# Patient Record
Sex: FEMALE | Race: WHITE | Employment: UNEMPLOYED | RURAL
[De-identification: names, ages, dates, MRNs, and addresses within clinical notes are randomized per-mention and may not be internally consistent; named-entity substitution may affect disease eponyms.]

---

## 2024-03-18 SDOH — HEALTH STABILITY: PHYSICAL HEALTH: ON AVERAGE, HOW MANY DAYS PER WEEK DO YOU ENGAGE IN MODERATE TO STRENUOUS EXERCISE (LIKE A BRISK WALK)?: 0 DAYS

## 2024-03-21 ENCOUNTER — OFFICE VISIT (OUTPATIENT)
Dept: PRIMARY CARE CLINIC | Age: 64
End: 2024-03-21
Payer: MEDICARE

## 2024-03-21 VITALS
OXYGEN SATURATION: 96 % | HEART RATE: 63 BPM | WEIGHT: 149.6 LBS | SYSTOLIC BLOOD PRESSURE: 91 MMHG | DIASTOLIC BLOOD PRESSURE: 52 MMHG

## 2024-03-21 DIAGNOSIS — K44.9 HIATAL HERNIA: ICD-10-CM

## 2024-03-21 DIAGNOSIS — R00.2 PALPITATION: ICD-10-CM

## 2024-03-21 DIAGNOSIS — Z87.11 HISTORY OF GASTRIC ULCER: ICD-10-CM

## 2024-03-21 DIAGNOSIS — K75.81 LIVER CIRRHOSIS SECONDARY TO NONALCOHOLIC STEATOHEPATITIS (NASH) (HCC): ICD-10-CM

## 2024-03-21 DIAGNOSIS — R79.9 ABNORMAL FINDING OF BLOOD CHEMISTRY, UNSPECIFIED: ICD-10-CM

## 2024-03-21 DIAGNOSIS — Z76.89 ENCOUNTER TO ESTABLISH CARE WITH NEW DOCTOR: Primary | ICD-10-CM

## 2024-03-21 DIAGNOSIS — Z86.39 HISTORY OF PARATHYROID DISEASE: ICD-10-CM

## 2024-03-21 DIAGNOSIS — E83.52 HYPERCALCEMIA: ICD-10-CM

## 2024-03-21 DIAGNOSIS — K74.60 LIVER CIRRHOSIS SECONDARY TO NONALCOHOLIC STEATOHEPATITIS (NASH) (HCC): ICD-10-CM

## 2024-03-21 DIAGNOSIS — J30.2 SEASONAL ALLERGIES: ICD-10-CM

## 2024-03-21 DIAGNOSIS — Z13.220 ENCOUNTER FOR LIPID SCREENING FOR CARDIOVASCULAR DISEASE: ICD-10-CM

## 2024-03-21 DIAGNOSIS — N28.9 KIDNEY LESION: ICD-10-CM

## 2024-03-21 DIAGNOSIS — Z13.6 ENCOUNTER FOR LIPID SCREENING FOR CARDIOVASCULAR DISEASE: ICD-10-CM

## 2024-03-21 DIAGNOSIS — K59.09 OTHER CONSTIPATION: ICD-10-CM

## 2024-03-21 DIAGNOSIS — D50.9 IRON DEFICIENCY ANEMIA, UNSPECIFIED IRON DEFICIENCY ANEMIA TYPE: ICD-10-CM

## 2024-03-21 PROCEDURE — G8400 PT W/DXA NO RESULTS DOC: HCPCS | Performed by: PHYSICIAN ASSISTANT

## 2024-03-21 PROCEDURE — G8421 BMI NOT CALCULATED: HCPCS | Performed by: PHYSICIAN ASSISTANT

## 2024-03-21 PROCEDURE — 99203 OFFICE O/P NEW LOW 30 MIN: CPT | Performed by: PHYSICIAN ASSISTANT

## 2024-03-21 PROCEDURE — 1123F ACP DISCUSS/DSCN MKR DOCD: CPT | Performed by: PHYSICIAN ASSISTANT

## 2024-03-21 PROCEDURE — G8427 DOCREV CUR MEDS BY ELIG CLIN: HCPCS | Performed by: PHYSICIAN ASSISTANT

## 2024-03-21 PROCEDURE — 4004F PT TOBACCO SCREEN RCVD TLK: CPT | Performed by: PHYSICIAN ASSISTANT

## 2024-03-21 PROCEDURE — 1090F PRES/ABSN URINE INCON ASSESS: CPT | Performed by: PHYSICIAN ASSISTANT

## 2024-03-21 PROCEDURE — 3017F COLORECTAL CA SCREEN DOC REV: CPT | Performed by: PHYSICIAN ASSISTANT

## 2024-03-21 PROCEDURE — G8484 FLU IMMUNIZE NO ADMIN: HCPCS | Performed by: PHYSICIAN ASSISTANT

## 2024-03-21 RX ORDER — SUCRALFATE 1 G/1
1 TABLET ORAL 4 TIMES DAILY
COMMUNITY

## 2024-03-21 RX ORDER — IRON HEME POLYPEPTIDE/FOLIC AC 12-1MG
1 TABLET ORAL 2 TIMES DAILY
COMMUNITY

## 2024-03-21 RX ORDER — PROPRANOLOL HYDROCHLORIDE 20 MG/1
20 TABLET ORAL 2 TIMES DAILY
COMMUNITY

## 2024-03-21 RX ORDER — SPIRONOLACTONE 100 MG/1
100 TABLET, FILM COATED ORAL DAILY
COMMUNITY

## 2024-03-21 RX ORDER — DOCUSATE SODIUM 100 MG/1
100 CAPSULE, LIQUID FILLED ORAL 3 TIMES DAILY PRN
COMMUNITY

## 2024-03-21 RX ORDER — ESOMEPRAZOLE MAGNESIUM 20 MG/1
20 GRANULE, DELAYED RELEASE ORAL 2 TIMES DAILY
COMMUNITY

## 2024-03-21 RX ORDER — LORATADINE 10 MG/1
10 TABLET ORAL DAILY
COMMUNITY

## 2024-03-21 ASSESSMENT — PATIENT HEALTH QUESTIONNAIRE - PHQ9
SUM OF ALL RESPONSES TO PHQ9 QUESTIONS 1 & 2: 0
SUM OF ALL RESPONSES TO PHQ QUESTIONS 1-9: 0
SUM OF ALL RESPONSES TO PHQ QUESTIONS 1-9: 0
1. LITTLE INTEREST OR PLEASURE IN DOING THINGS: NOT AT ALL
2. FEELING DOWN, DEPRESSED OR HOPELESS: NOT AT ALL
SUM OF ALL RESPONSES TO PHQ QUESTIONS 1-9: 0
SUM OF ALL RESPONSES TO PHQ QUESTIONS 1-9: 0

## 2024-03-21 ASSESSMENT — ENCOUNTER SYMPTOMS
GASTROINTESTINAL NEGATIVE: 1
RESPIRATORY NEGATIVE: 1

## 2024-03-21 NOTE — PROGRESS NOTES
Chief Complaint   Patient presents with    Establish Care     Pt here to establish care with provider.        Have you seen any other physician or provider since your last visit yes -     Have you had any other diagnostic tests since your last visit? no    Have you changed or stopped any medications since your last visit? no           
Patient Fasting?/# of Hours     Answer:   12     Order Specific Question:   Has the patient fasted?     Answer:   Yes    TSH with Reflex     Standing Status:   Future     Standing Expiration Date:   4/21/2024    Vitamin D 25 Hydroxy     Standing Status:   Future     Standing Expiration Date:   4/21/2024    Iron and TIBC     Standing Status:   Future     Standing Expiration Date:   4/21/2024     Order Specific Question:   Is Patient Fasting?     Answer:   yes     Order Specific Question:   No of Hours?     Answer:   12    Ferritin     Standing Status:   Future     Standing Expiration Date:   4/21/2024    Transferrin Saturation     Standing Status:   Future     Standing Expiration Date:   4/21/2024    Hepatic Function Panel     Standing Status:   Future     Standing Expiration Date:   4/21/2024    External Referral To Gastroenterology     Referral Priority:   Routine     Referral Type:   Eval and Treat     Referral Reason:   Specialty Services Required     Requested Specialty:   Gastroenterology     Number of Visits Requested:   1           Electronically signed by LYNSEY Stark on 3/21/2024

## 2024-03-21 NOTE — ASSESSMENT & PLAN NOTE
History of gastric ulcer in the past.  Will no longer take NSAIDs.  P.o. and IM steroids have both caused abdominal pain.  Will avoid these in the future as well.  No abdominal pain, NVD, melena, bloody stool.  Continue to monitor.

## 2024-03-21 NOTE — ASSESSMENT & PLAN NOTE
Stable.  Had mass on parathyroid gland.  Was removed surgically.  Will continue to monitor calcium and vitamin D levels.

## 2024-03-21 NOTE — ASSESSMENT & PLAN NOTE
History of iron deficient anemia.  She is currently on iron supplements.  Will continue to monitor iron levels and CBC.  Has been complicated in the past to clinic concurrent GI bleed.  No NVD, melena, bloody stools at this time.  Continue to monitor.

## 2024-03-21 NOTE — ASSESSMENT & PLAN NOTE
Follows with GI.  Will continue to monitor hepatic function panel for worsening liver function.  Has been on transplant list before.

## 2024-03-21 NOTE — ASSESSMENT & PLAN NOTE
History of palpitations.  I think cardiology in the past.  Has had Holter monitor.  Overall negative workup.  Was given propranolol to control symptoms.

## 2024-03-26 ENCOUNTER — TRANSCRIBE ORDERS (OUTPATIENT)
Dept: ADMINISTRATIVE | Facility: HOSPITAL | Age: 64
End: 2024-03-26
Payer: MEDICARE

## 2024-03-26 ENCOUNTER — HOSPITAL ENCOUNTER (OUTPATIENT)
Facility: HOSPITAL | Age: 64
Discharge: HOME | End: 2024-03-26
Payer: MEDICARE

## 2024-03-26 DIAGNOSIS — N28.9 KIDNEY LESION: Primary | ICD-10-CM

## 2024-03-26 LAB
25(OH)D3 SERPL-MCNC: 54.4 NG/ML (ref 32–100)
ALBUMIN SERPL-MCNC: 4.6 G/DL (ref 3.4–4.8)
ALBUMIN/GLOB SERPL: 1.8 {RATIO} (ref 0.8–2)
ALP SERPL-CCNC: 64 U/L (ref 25–100)
ALT SERPL-CCNC: 19 U/L (ref 4–36)
ANION GAP SERPL CALCULATED.3IONS-SCNC: 10 MMOL/L (ref 3–16)
AST SERPL-CCNC: 22 U/L (ref 8–33)
BASOPHILS # BLD: 0 K/UL (ref 0–0.1)
BASOPHILS NFR BLD: 0.3 %
BILIRUB DIRECT SERPL-MCNC: <0.2 MG/DL (ref 0–0.2)
BILIRUB INDIRECT SERPL-MCNC: NORMAL MG/DL (ref 0.2–0.8)
BILIRUB SERPL-MCNC: 1.3 MG/DL (ref 0.3–1.2)
BUN SERPL-MCNC: 15 MG/DL (ref 6–20)
CALCIUM SERPL-MCNC: 9.8 MG/DL (ref 8.5–10.5)
CHLORIDE SERPL-SCNC: 99 MMOL/L (ref 98–107)
CHOLEST SERPL-MCNC: 216 MG/DL (ref 0–200)
CO2 SERPL-SCNC: 31 MMOL/L (ref 20–30)
CREAT SERPL-MCNC: 0.7 MG/DL (ref 0.4–1.2)
EOSINOPHIL # BLD: 0.1 K/UL (ref 0–0.4)
EOSINOPHIL NFR BLD: 3.2 %
ERYTHROCYTE [DISTWIDTH] IN BLOOD BY AUTOMATED COUNT: 12.4 % (ref 11–16)
FERRITIN SERPL IA-MCNC: 94 NG/ML (ref 22–322)
GFR SERPLBLD CREATININE-BSD FMLA CKD-EPI: >90 ML/MIN/{1.73_M2}
GLOBULIN SER CALC-MCNC: 2.5 G/DL
GLUCOSE SERPL-MCNC: 78 MG/DL (ref 74–106)
HBA1C MFR BLD: 5.1 %
HCT VFR BLD AUTO: 40.6 % (ref 37–47)
HDLC SERPL-MCNC: 84 MG/DL (ref 40–60)
HGB BLD-MCNC: 13.4 G/DL (ref 11.5–16.5)
IMM GRANULOCYTES # BLD: 0 K/UL
IMM GRANULOCYTES NFR BLD: 0.3 % (ref 0–5)
IRON SERPL-MCNC: 153 UG/DL (ref 37–145)
LDLC SERPL CALC-MCNC: 119 MG/DL
LYMPHOCYTES # BLD: 1.1 K/UL (ref 1.5–4)
LYMPHOCYTES NFR BLD: 32 %
MCH RBC QN AUTO: 31.2 PG (ref 27–32)
MCHC RBC AUTO-ENTMCNC: 33 G/DL (ref 31–35)
MCV RBC AUTO: 94.4 FL (ref 80–100)
MONOCYTES # BLD: 0.3 K/UL (ref 0.2–0.8)
MONOCYTES NFR BLD: 8.6 %
NEUTROPHILS # BLD: 1.9 K/UL (ref 2–7.5)
NEUTS SEG NFR BLD: 55.6 %
PLATELET # BLD AUTO: 153 K/UL (ref 150–400)
PMV BLD AUTO: 10.6 FL (ref 6–10)
POTASSIUM SERPL-SCNC: 4.4 MMOL/L (ref 3.4–5.1)
PROT SERPL-MCNC: 7.1 G/DL (ref 6.4–8.3)
RBC # BLD AUTO: 4.3 M/UL (ref 3.8–5.8)
SODIUM SERPL-SCNC: 140 MMOL/L (ref 136–145)
TIBC SERPL-MCNC: 353 UG/DL (ref 250–450)
TRANSFERRIN SERPL-MCNC: 330 MG/DL (ref 215–380)
TRIGL SERPL-MCNC: 67 MG/DL (ref 0–249)
TSH SERPL-MCNC: 2.4 UIU/ML (ref 0.27–4.2)
VLDLC SERPL CALC-MCNC: 13 MG/DL
WBC # BLD AUTO: 3.5 K/UL (ref 4–11)

## 2024-03-26 PROCEDURE — 85025 COMPLETE CBC W/AUTO DIFF WBC: CPT

## 2024-03-26 PROCEDURE — 82248 BILIRUBIN DIRECT: CPT

## 2024-03-26 PROCEDURE — 84443 ASSAY THYROID STIM HORMONE: CPT

## 2024-03-26 PROCEDURE — 83036 HEMOGLOBIN GLYCOSYLATED A1C: CPT

## 2024-03-26 PROCEDURE — 82728 ASSAY OF FERRITIN: CPT

## 2024-03-26 PROCEDURE — 80061 LIPID PANEL: CPT

## 2024-03-26 PROCEDURE — 83540 ASSAY OF IRON: CPT

## 2024-03-26 PROCEDURE — 80053 COMPREHEN METABOLIC PANEL: CPT

## 2024-03-26 PROCEDURE — 82306 VITAMIN D 25 HYDROXY: CPT

## 2024-03-26 PROCEDURE — 84466 ASSAY OF TRANSFERRIN: CPT

## 2024-03-26 PROCEDURE — 36415 COLL VENOUS BLD VENIPUNCTURE: CPT

## 2024-05-02 ENCOUNTER — TELEMEDICINE (OUTPATIENT)
Dept: PRIMARY CARE CLINIC | Age: 64
End: 2024-05-02

## 2024-05-02 DIAGNOSIS — J32.9 SINUSITIS, UNSPECIFIED CHRONICITY, UNSPECIFIED LOCATION: Primary | ICD-10-CM

## 2024-05-02 RX ORDER — DEXTROMETHORPHAN HYDROBROMIDE AND PROMETHAZINE HYDROCHLORIDE 15; 6.25 MG/5ML; MG/5ML
5 SYRUP ORAL 4 TIMES DAILY PRN
Qty: 180 ML | Refills: 0 | Status: SHIPPED | OUTPATIENT
Start: 2024-05-02 | End: 2024-05-09

## 2024-05-02 RX ORDER — AMOXICILLIN 875 MG/1
875 TABLET, COATED ORAL 2 TIMES DAILY
Qty: 14 TABLET | Refills: 0 | Status: SHIPPED | OUTPATIENT
Start: 2024-05-02 | End: 2024-05-09

## 2024-05-02 ASSESSMENT — ENCOUNTER SYMPTOMS
GASTROINTESTINAL NEGATIVE: 1
COUGH: 1
RHINORRHEA: 1
SORE THROAT: 1

## 2024-05-02 NOTE — PROGRESS NOTES
Karolina Noguera is a 63 y.o. female evaluated via audio-only technology on 5/2/2024 for No chief complaint on file.  .      Explained to patient this is a virtual visit.  This does not replace or equate to an in person evaluation.  Explained that I am unable to perform a full physical exam or do any testing in this situation. If patient feels the need to be seen in person they understand to go to the nearest emergency room.    Assessment & Plan:   Sinusitis, unspecified chronicity, unspecified location    Will send at home COVID test to patient to check for COVID.  Likely viral URI.  Since it has been a week and she has had a mild fever, will call on amoxicillin and Promethazine DM.  Explained that if it is viral antibiotics will not treated.  Any worsening go to ER.  Return to clinic as needed.    No follow-ups on file.     Subjective:     Pt call with c/o 1 week of cough, sneeze, stuffy/runny nose.  Has tried Robitussin and Benadryl OTC with minimal relief.  Denies any chest pain or shortness of breath.  Has had a temperature up to 100.    LMP - hyst     Prior to Admission medications    Medication Sig Start Date End Date Taking? Authorizing Provider   amoxicillin (AMOXIL) 875 MG tablet Take 1 tablet by mouth 2 times daily for 7 days 5/2/24 5/9/24 Yes Evan Swanson PA   promethazine-dextromethorphan (PROMETHAZINE-DM) 6.25-15 MG/5ML syrup Take 5 mLs by mouth 4 times daily as needed for Cough 5/2/24 5/9/24 Yes Evan Swanson PA   docusate sodium (COLACE) 100 MG capsule Take 1 capsule by mouth 3 times daily as needed for Constipation    Anne Marie Benz MD   esomeprazole Magnesium (NEXIUM) 20 MG PACK Take 1 packet by mouth 2 times daily    Anne Marie Benz MD   loratadine (CLARITIN) 10 MG tablet Take 1 tablet by mouth daily    Anne Marie Benz MD   Fe Heme Polypeptide-Folic Acid (PROFERRIN-FORTE) 12-1 MG TABS Take 1 tablet by mouth 2 times daily    Anne Marie Benz MD   propranolol (INDERAL) 20

## 2024-05-10 ENCOUNTER — COMMUNITY OUTREACH (OUTPATIENT)
Dept: PRIMARY CARE CLINIC | Age: 64
End: 2024-05-10

## 2024-05-10 NOTE — PROGRESS NOTES
Patient's  shows they are overdue for Mammogram, Colonoscopy   CareEverywhere and  files searched  without success.

## 2024-05-14 ENCOUNTER — HOSPITAL ENCOUNTER (OUTPATIENT)
Dept: MRI IMAGING | Facility: HOSPITAL | Age: 64
Discharge: HOME | End: 2024-05-14
Admitting: PHYSICIAN ASSISTANT
Payer: MEDICARE

## 2024-05-14 DIAGNOSIS — N28.9 KIDNEY LESION: ICD-10-CM

## 2024-05-14 PROCEDURE — 74181 MRI ABDOMEN W/O CONTRAST: CPT

## 2024-05-27 SDOH — ECONOMIC STABILITY: FOOD INSECURITY: WITHIN THE PAST 12 MONTHS, YOU WORRIED THAT YOUR FOOD WOULD RUN OUT BEFORE YOU GOT MONEY TO BUY MORE.: NEVER TRUE

## 2024-05-27 SDOH — ECONOMIC STABILITY: HOUSING INSECURITY
IN THE LAST 12 MONTHS, WAS THERE A TIME WHEN YOU DID NOT HAVE A STEADY PLACE TO SLEEP OR SLEPT IN A SHELTER (INCLUDING NOW)?: NO

## 2024-05-27 SDOH — ECONOMIC STABILITY: INCOME INSECURITY: HOW HARD IS IT FOR YOU TO PAY FOR THE VERY BASICS LIKE FOOD, HOUSING, MEDICAL CARE, AND HEATING?: NOT VERY HARD

## 2024-05-27 SDOH — ECONOMIC STABILITY: TRANSPORTATION INSECURITY
IN THE PAST 12 MONTHS, HAS LACK OF TRANSPORTATION KEPT YOU FROM MEETINGS, WORK, OR FROM GETTING THINGS NEEDED FOR DAILY LIVING?: NO

## 2024-05-27 SDOH — ECONOMIC STABILITY: FOOD INSECURITY: WITHIN THE PAST 12 MONTHS, THE FOOD YOU BOUGHT JUST DIDN'T LAST AND YOU DIDN'T HAVE MONEY TO GET MORE.: NEVER TRUE

## 2024-05-28 ENCOUNTER — OFFICE VISIT (OUTPATIENT)
Dept: PRIMARY CARE CLINIC | Age: 64
End: 2024-05-28

## 2024-05-28 VITALS
HEART RATE: 73 BPM | SYSTOLIC BLOOD PRESSURE: 96 MMHG | OXYGEN SATURATION: 95 % | DIASTOLIC BLOOD PRESSURE: 51 MMHG | WEIGHT: 150.6 LBS

## 2024-05-28 DIAGNOSIS — K74.60 LIVER CIRRHOSIS SECONDARY TO NONALCOHOLIC STEATOHEPATITIS (NASH) (HCC): ICD-10-CM

## 2024-05-28 DIAGNOSIS — I95.9 HYPOTENSION, UNSPECIFIED HYPOTENSION TYPE: ICD-10-CM

## 2024-05-28 DIAGNOSIS — J30.2 SEASONAL ALLERGIES: ICD-10-CM

## 2024-05-28 DIAGNOSIS — R00.2 PALPITATION: Primary | ICD-10-CM

## 2024-05-28 DIAGNOSIS — K75.81 LIVER CIRRHOSIS SECONDARY TO NONALCOHOLIC STEATOHEPATITIS (NASH) (HCC): ICD-10-CM

## 2024-05-28 DIAGNOSIS — Z87.11 HISTORY OF GASTRIC ULCER: ICD-10-CM

## 2024-05-28 DIAGNOSIS — M79.89 LEG SWELLING: ICD-10-CM

## 2024-05-28 DIAGNOSIS — N28.9 KIDNEY LESION: ICD-10-CM

## 2024-05-28 DIAGNOSIS — D50.9 IRON DEFICIENCY ANEMIA, UNSPECIFIED IRON DEFICIENCY ANEMIA TYPE: ICD-10-CM

## 2024-05-28 ASSESSMENT — ENCOUNTER SYMPTOMS
GASTROINTESTINAL NEGATIVE: 1
RESPIRATORY NEGATIVE: 1

## 2024-05-28 NOTE — PROGRESS NOTES
Chief Complaint   Patient presents with    Follow-up     Pt here for FU, has done MRI report is in chart.        Have you seen any other physician or provider since your last visit no    Have you had any other diagnostic tests since your last visit? no    Have you changed or stopped any medications since your last visit? no         
HDL Cholesterol: 84 mg/dL      Total Cholesterol: 216 mg/dL  Diabetes Screening:  DM foot exam- n/a  DM Retinal exam - n/a  Last CRC screen - Mom had CRC at 41. + Polyps. Gets q5 years. Last one was .   Intimate partner violence - denies   DEXA - Dec 2023. Has osteopenia.   LMP - hyst   Last mammo -  Dec 2023. Wnl. Yearly.    Last PAP - complete hyst   Vaccination status: Otherwise UTD  Needed:   Declines:   Tobacco use- never  Quit - n/a  EtOH use - never  Quit - n/a  Dentist - yearly  Derm - does not see  Sunscreen - occasionally   Hep C and HIV - low risk      Current Outpatient Medications:     docusate sodium (COLACE) 100 MG capsule, Take 1 capsule by mouth 3 times daily as needed for Constipation, Disp: , Rfl:     esomeprazole Magnesium (NEXIUM) 20 MG PACK, Take 1 packet by mouth 2 times daily, Disp: , Rfl:     loratadine (CLARITIN) 10 MG tablet, Take 1 tablet by mouth daily, Disp: , Rfl:     Fe Heme Polypeptide-Folic Acid (PROFERRIN-FORTE) 12-1 MG TABS, Take 1 tablet by mouth 2 times daily, Disp: , Rfl:     propranolol (INDERAL) 20 MG tablet, Take 1 tablet by mouth 2 times daily, Disp: , Rfl:     spironolactone (ALDACTONE) 100 MG tablet, Take 1 tablet by mouth daily, Disp: , Rfl:     sucralfate (CARAFATE) 1 GM tablet, Take 1 tablet by mouth 4 times daily, Disp: , Rfl:   Past Medical History:   Diagnosis Date    Nonalcoholic fatty liver disease     Trigger finger          Past Surgical History:   Procedure Laterality Date    BILE DUCT STENT PLACEMENT           SECTION          CHOLECYSTECTOMY      2007    DILATION AND CURETTAGE OF UTERUS      1982    ESOPHAGOGASTRODUODENOSCOPY      GASTRIC BYPASS SURGERY      2006    PARATHYROIDECTOMY      PARTIAL HYSTERECTOMY (CERVIX NOT REMOVED)      SALPINGO-OOPHORECTOMY        reports that she has never smoked. She has never been exposed to tobacco smoke. She has never used smokeless tobacco. She reports that she does not drink alcohol and does

## 2024-05-29 ENCOUNTER — TRANSCRIBE ORDERS (OUTPATIENT)
Dept: ADMINISTRATIVE | Facility: HOSPITAL | Age: 64
End: 2024-05-29
Payer: MEDICARE

## 2024-05-29 DIAGNOSIS — N28.9 KIDNEY LESION: Primary | ICD-10-CM

## 2024-06-04 ENCOUNTER — TRANSCRIBE ORDERS (OUTPATIENT)
Dept: ADMINISTRATIVE | Facility: HOSPITAL | Age: 64
End: 2024-06-04
Payer: MEDICARE

## 2024-06-04 DIAGNOSIS — N28.9 KIDNEY LESION: Primary | ICD-10-CM

## 2024-06-26 ENCOUNTER — OFFICE VISIT (OUTPATIENT)
Dept: GASTROENTEROLOGY | Facility: CLINIC | Age: 64
End: 2024-06-26
Payer: MEDICARE

## 2024-06-26 VITALS
HEIGHT: 65 IN | WEIGHT: 151 LBS | TEMPERATURE: 97.7 F | SYSTOLIC BLOOD PRESSURE: 110 MMHG | HEART RATE: 92 BPM | DIASTOLIC BLOOD PRESSURE: 56 MMHG | BODY MASS INDEX: 25.16 KG/M2

## 2024-06-26 DIAGNOSIS — Z86.718 PERSONAL HISTORY OF OTHER VENOUS THROMBOSIS AND EMBOLISM: ICD-10-CM

## 2024-06-26 DIAGNOSIS — K75.81 NASH (NONALCOHOLIC STEATOHEPATITIS): Primary | ICD-10-CM

## 2024-06-26 DIAGNOSIS — Z86.010 HISTORY OF COLON POLYPS: ICD-10-CM

## 2024-06-26 DIAGNOSIS — K76.6 PORTAL HYPERTENSION: ICD-10-CM

## 2024-06-26 DIAGNOSIS — K75.81 METABOLIC DYSFUNCTION-ASSOCIATED STEATOHEPATITIS (MASH): ICD-10-CM

## 2024-06-26 DIAGNOSIS — Z80.0 FAMILY HISTORY OF COLON CANCER: ICD-10-CM

## 2024-06-26 RX ORDER — IRON HEME POLYPEPTIDE/FOLIC AC 12-1MG
1 TABLET ORAL 2 TIMES DAILY
COMMUNITY

## 2024-06-26 RX ORDER — SUCRALFATE 1 G/1
1 TABLET ORAL 3 TIMES DAILY PRN
COMMUNITY

## 2024-06-26 RX ORDER — DOCUSATE SODIUM 100 MG/1
100 CAPSULE, LIQUID FILLED ORAL 3 TIMES DAILY PRN
COMMUNITY

## 2024-06-26 RX ORDER — ESOMEPRAZOLE MAGNESIUM 20 MG/1
20 GRANULE, DELAYED RELEASE ORAL 2 TIMES DAILY
COMMUNITY

## 2024-06-26 NOTE — PROGRESS NOTES
New Patient Consult      Date: 2024   Patient Name: Zunilda Garcias  MRN: [id number]  : 1960     Referring Physician: Magan Mills PA    Chief Complaint   Patient presents with    Establish Care     LIver cirrhosis, KAITLYN, Hx gastric ulcer         History of Present Illness: Zunilda Garcias is a 63 y.o. female who is here today to establish care with Gastroenterology.    Not having any active symptoms at this time.  She does have a fair bit of GI history as follows:    She had a gastric bypass in .    A hiatal hernia was surgically repaired in .    She underwent a cholecystectomy after that, and unfortunately had a significant bile duct injury.  She mentions that the common bile duct got transected during her laparoscopic cholecystectomy.  Sounds like she underwent a surgical bile duct repair for this.  The postop course was complicated by a blood clot.  She seems to recall that could have been in her bile duct, but it is possible that this could have been a hepatic venous thrombosis.    She was on warfarin for some time, but has not been on it for many years.  This was being watched closely by her hepatobiliary team, with serial imaging, and then eventually they mention that it was resorbed, but it would potentially give her a higher risk of portal hypertension down the road.    She does have portal hypertension.  At 1 point she even had ascites, presumed secondary to this.    She is on a nonselective beta-blocker at the moment.  Has been on this for some time.    She is also on spironolactone, as she has had chronic lower extremity edema ever since the above history.    At the time being she denies any symptoms.  She also has a history of a gastric ulcer back in .    She has also had a history of iron deficiency anemia, and has had iron replacement therapy.  She denies any black stool or melena or blood per rectum or hematemesis at the moment.    Her last abdominal ultrasound was in  2024.      She also had an MRI done recently without IV contrast in May 2024 to evaluate some kidney lesions, that were seen on ultrasound.  Numerous bilateral renal lesions identified, many of which were described to be cysts, but there was a recommendation to follow-up with a contrast enhanced MRI, which is currently pending for next month.    Regarding the biliary system, they describe postoperative changes at the zana hepatis.  Surgical changes of the extrahepatic biliary tree, prominent hepatic vein branches, and portosystemic shunt versus vascular malformation in the inferior right liver.  Spleen was mildly enlarged.  No significant intrahepatic biliary ductal dilation.    Her last colonoscopy was in 2020.  Next due in 2025.    Subjective      Past Medical History:   Diagnosis Date    Cirrhosis of liver     Common bile duct clot     was severed during GB surgery    Cystic kidney disease     Esophageal varices     After common bile duct injury and blood clot in CBD    Gastric ulcer     GI (gastrointestinal bleed)     After surgical repair of CBD injury    Hypercalcemia     Iron deficiency anemia     Irregular heartbeat     Kidney lesion     Liver cirrhosis secondary to COLON     Palpitations     Seasonal allergies        Past Surgical History:   Procedure Laterality Date    ABDOMINAL HERNIA REPAIR       SECTION N/A      SECTION N/A     COLONOSCOPY      Routine every 5 years    COMMON BILE DUCT EXPLORATION N/A     repair the severing that happened during GB surgery    DILATATION AND CURETTAGE N/A     ENDOSCOPY N/A     GASTRIC BYPASS N/A     HYSTERECTOMY N/A     cervix not removed    LAPAROSCOPIC CHOLECYSTECTOMY N/A     Clot in bile duct, post surgery complication    PARATHYROIDECTOMY      SALPINGO OOPHORECTOMY Bilateral     TRIGGER FINGER RELEASE      UPPER GASTROINTESTINAL ENDOSCOPY      Follow up per previous  Gastroenterologist       Family History   Problem Relation Age of Onset    Colon cancer Mother         Surgically removed at 42 years old    Colon polyps Mother     Ovarian cancer Mother     Alcohol abuse Father     Inflammatory bowel disease Paternal Aunt     Colon cancer Cousin        Social History     Socioeconomic History    Marital status:    Tobacco Use    Smoking status: Never    Smokeless tobacco: Never    Tobacco comments:     Zero tobacco use ever   Vaping Use    Vaping status: Never Used   Substance and Sexual Activity    Alcohol use: Never    Drug use: Never    Sexual activity: Defer         Current Outpatient Medications:     Calcium Citrate (CITRACAL PO), Take  by mouth Daily., Disp: , Rfl:     docusate sodium (COLACE) 100 MG capsule, Take 1 capsule by mouth 3 (Three) Times a Day As Needed for Constipation., Disp: , Rfl:     esomeprazole (NexIUM) 20 MG packet, Take 20 mg by mouth 2 (Two) Times a Day., Disp: , Rfl:     Fe Heme Polypeptide-Folic Acid (Proferrin-Forte) 12-1 MG tablet, Take 1 tablet by mouth 2 (Two) Times a Day., Disp: , Rfl:     Loratadine (Claritin) 10 MG capsule, Take  by mouth., Disp: , Rfl:     multivitamin with minerals tablet tablet, Take 1 tablet by mouth Daily., Disp: , Rfl:     propranolol (INDERAL) 20 MG tablet, , Disp: , Rfl:     spironolactone (ALDACTONE) 100 MG tablet, , Disp: , Rfl:     sucralfate (CARAFATE) 1 g tablet, Take 1 tablet by mouth 3 (Three) Times a Day As Needed., Disp: , Rfl:     Allergies   Allergen Reactions    Nsaids Nausea Only     Hx Bleeding ulcer         Review of Systems   Constitutional:  Positive for appetite change (when her belly is upset). Negative for fatigue, fever and unexpected weight loss.   HENT:  Negative for trouble swallowing.    Gastrointestinal:  Positive for abdominal pain (sometimes, with certain foods) and constipation. Negative for abdominal distention, anal bleeding, blood in stool, diarrhea, nausea, rectal pain, vomiting,  "GERD and indigestion.       The following portions of the patient's history were reviewed and updated as appropriate: allergies, current medications, past family history, past medical history, past social history, past surgical history and problem list.    Objective     Physical Exam:  Vitals:    06/26/24 0956   BP: 110/56   Pulse: 92   Temp: 97.7 °F (36.5 °C)   TempSrc: Infrared   Weight: 68.5 kg (151 lb)  Comment: with clothing/shoes   Height: 165.1 cm (65\")  Comment: per pt       Physical Exam  Constitutional:       General: She is not in acute distress.     Appearance: Normal appearance.   HENT:      Head: Normocephalic.   Eyes:      General: No scleral icterus.     Conjunctiva/sclera: Conjunctivae normal.   Cardiovascular:      Rate and Rhythm: Normal rate.   Pulmonary:      Effort: Pulmonary effort is normal. No respiratory distress.   Musculoskeletal:      Right lower leg: Edema present.      Left lower leg: Edema present.   Skin:     Coloration: Skin is not jaundiced or pale.   Neurological:      General: No focal deficit present.      Mental Status: She is alert and oriented to person, place, and time.   Psychiatric:         Mood and Affect: Mood normal.         Behavior: Behavior normal.         Results Review:   I have reviewed the patient's new clinical and imaging results and agree with the interpretation.     No visits with results within 90 Day(s) from this visit.   Latest known visit with results is:   No results found for any previous visit.      MRI Abdomen Without Contrast    Result Date: 5/14/2024  1. Numerous bilateral renal lesions many of which are cysts although lesions of the left kidney are not completely characterized without the benefit of contrast to assess for solid enhancing component. Largest measures 14 mm with indeterminate features. If patient is not a candidate for contrast administration for further characterization, a 6-month follow-up is recommended to assess size stability. 2. "  No evidence of biliary obstruction.   This report was signed and finalized on 5/14/2024 10:32 AM by Manas Mcdonald MD.       Assessment / Plan      Assessment & Plan:  Diagnoses and all orders for this visit:    1. COLON (nonalcoholic steatohepatitis) (Primary)  -     COLON Fibrosure    2. Personal history of other venous thrombosis and embolism  -     COLON Fibrosure    3. Family history of colon cancer    4. History of colon polyps    5. Metabolic dysfunction-associated steatohepatitis (MASH)    6. 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    Will check a Colon FibroSure, as she was also told that she likely had COLON.    Suspect that she has portal hypertension out of proportion to any amount of fibrosis, as a consequence of possible hepatic vein thrombosis which was likely a postop consequence.  See details in the HPI above.    Continue nonselective beta-blocker.    Consider abdominal imaging for HCC screening at a 6-month interval based on Colon FibroSure testing.    At the moment she has been having once yearly testing.    Her EGD was done in 2023.  Report is not available, but she does not recall any concerning findings.  Was told to follow-up in 3 years.    Colonoscopy will be due in October 2025.  Would like to coordinate with an EGD.    The iron deficiency anemia is likely a consequence of her post gastric bypass status.      If there are any symptoms, or drop in hemoglobin and, can consider a PillCam.    Follow Up:   Return in about 8 months (around 2/26/2025).    Nikolay Solis MD  [facility] [place]    6/26/2024  10:57 EDT    Part of this note may be an electronic transcription/translation of spoken language to printed text using the Dragon Dictation System.

## 2024-07-24 ENCOUNTER — HOSPITAL ENCOUNTER (OUTPATIENT)
Dept: MRI IMAGING | Facility: HOSPITAL | Age: 64
Discharge: HOME | End: 2024-07-24
Admitting: PHYSICIAN ASSISTANT
Payer: MEDICARE

## 2024-07-24 DIAGNOSIS — N28.9 KIDNEY LESION: ICD-10-CM

## 2024-07-24 PROCEDURE — 74183 MRI ABD W/O CNTR FLWD CNTR: CPT

## 2024-07-24 PROCEDURE — 0 GADOBENATE DIMEGLUMINE 529 MG/ML SOLUTION: Performed by: PHYSICIAN ASSISTANT

## 2024-07-24 PROCEDURE — A9577 INJ MULTIHANCE: HCPCS | Performed by: PHYSICIAN ASSISTANT

## 2024-07-24 RX ADMIN — GADOBENATE DIMEGLUMINE 15 ML: 529 INJECTION, SOLUTION INTRAVENOUS at 07:18

## 2024-07-25 ENCOUNTER — TELEPHONE (OUTPATIENT)
Dept: GASTROENTEROLOGY | Facility: CLINIC | Age: 64
End: 2024-07-25
Payer: MEDICARE

## 2024-07-25 NOTE — TELEPHONE ENCOUNTER
----- Message from Nikolay Solis sent at 7/25/2024 11:22 AM EDT -----  Noted.  I reviewed the MRI results.  Has some chronic, expected findings in the liver, but no other concerning findings.  ----- Message -----  From: Oneyda Stack MA  Sent: 7/25/2024  11:18 AM EDT  To: Nikolay Solis MD    Patient wanted to let you know that the MRI was done. Looks like the result is in the chart.

## 2024-07-31 ENCOUNTER — OFFICE VISIT (OUTPATIENT)
Dept: PRIMARY CARE CLINIC | Age: 64
End: 2024-07-31
Payer: MEDICARE

## 2024-07-31 VITALS
HEART RATE: 68 BPM | SYSTOLIC BLOOD PRESSURE: 90 MMHG | OXYGEN SATURATION: 95 % | WEIGHT: 151.4 LBS | DIASTOLIC BLOOD PRESSURE: 47 MMHG

## 2024-07-31 DIAGNOSIS — I95.9 HYPOTENSION, UNSPECIFIED HYPOTENSION TYPE: ICD-10-CM

## 2024-07-31 DIAGNOSIS — Z86.39 HISTORY OF PARATHYROID DISEASE: ICD-10-CM

## 2024-07-31 DIAGNOSIS — Z87.11 HISTORY OF GASTRIC ULCER: ICD-10-CM

## 2024-07-31 DIAGNOSIS — K74.60 LIVER CIRRHOSIS SECONDARY TO NONALCOHOLIC STEATOHEPATITIS (NASH) (HCC): Primary | ICD-10-CM

## 2024-07-31 DIAGNOSIS — N28.9 KIDNEY LESION: ICD-10-CM

## 2024-07-31 DIAGNOSIS — D50.9 IRON DEFICIENCY ANEMIA, UNSPECIFIED IRON DEFICIENCY ANEMIA TYPE: ICD-10-CM

## 2024-07-31 DIAGNOSIS — M79.89 LEG SWELLING: ICD-10-CM

## 2024-07-31 DIAGNOSIS — K75.81 LIVER CIRRHOSIS SECONDARY TO NONALCOHOLIC STEATOHEPATITIS (NASH) (HCC): Primary | ICD-10-CM

## 2024-07-31 PROCEDURE — 4004F PT TOBACCO SCREEN RCVD TLK: CPT | Performed by: PHYSICIAN ASSISTANT

## 2024-07-31 PROCEDURE — G8421 BMI NOT CALCULATED: HCPCS | Performed by: PHYSICIAN ASSISTANT

## 2024-07-31 PROCEDURE — 1090F PRES/ABSN URINE INCON ASSESS: CPT | Performed by: PHYSICIAN ASSISTANT

## 2024-07-31 PROCEDURE — G8400 PT W/DXA NO RESULTS DOC: HCPCS | Performed by: PHYSICIAN ASSISTANT

## 2024-07-31 PROCEDURE — 99213 OFFICE O/P EST LOW 20 MIN: CPT | Performed by: PHYSICIAN ASSISTANT

## 2024-07-31 PROCEDURE — 1123F ACP DISCUSS/DSCN MKR DOCD: CPT | Performed by: PHYSICIAN ASSISTANT

## 2024-07-31 PROCEDURE — 3017F COLORECTAL CA SCREEN DOC REV: CPT | Performed by: PHYSICIAN ASSISTANT

## 2024-07-31 PROCEDURE — G8427 DOCREV CUR MEDS BY ELIG CLIN: HCPCS | Performed by: PHYSICIAN ASSISTANT

## 2024-07-31 ASSESSMENT — ENCOUNTER SYMPTOMS
RESPIRATORY NEGATIVE: 1
GASTROINTESTINAL NEGATIVE: 1

## 2024-07-31 NOTE — PROGRESS NOTES
Subjective:     Karolina Noguera is a 64 y.o. female.    Chief Complaint   Patient presents with    Follow-up     Pt here for regular 3 month FU and discuss MRI results.        HPI     Pt here for follow up. Overall she is doing well.  She denies any complaints at this time. Should have had GI appt on 6/26 at [facility]. She saw Dr. Dhaliwal. She is now established with them. They will order yearly liver US. They reccommended EGD/C-scope next. She takes vit d and CA.    Chronic conditions:    His of gastric bypass - 2006.    Hiatal hernia - surgically repaired 2007.     His of jaundice - from common bile duct injury 2/2 lap brayan. Does follow with GI. Was able to have this repaired. Surgery was complicated by clot in bile duct which required her to have warfarin for a little while.     Gastric ulcer 2009 - has bled in the past. Occasionally takes carafate, has rx for this. Takes nexium daily. No abd pain, NVD, melena, bloody stool.     Constipation - takes docusate daily. Has BM daily, no pain or bleeding.     Has DUNN/cirrhosis - has seen transplant team in the past. She gets liver US yearly, lab work. On spironolactone for fluid on abd from chronic issues. Takes daily.     Irregular heart/palpitations - has had holter in the past. No concerning arrhythmias. Has seen cardiology in the past. She will still feel palipations.     Seasonal allergies - controlled with otc claritin.     Cystic kidney -  found on US of right kidney. Recommended MRI with contrast     Fe def anemia - has had transfusions in the past due to GIB. Has chronic Fe def anemia that has been complicated by GIB.     Parathyroidectomy - growth. Had elevated Ca.     SCREENINGS:    Last Depression Screening - 2024  10 year ASCVD Risk at 19 y/o q4-6yr - The 10-year ASCVD risk score (Cara LOMBARDI, et al., 2019) is: 2.8%    Values used to calculate the score:      Age: 64 years      Sex: Female      Is Non- : No      Diabetic: No      Tobacco

## 2024-07-31 NOTE — PROGRESS NOTES
Chief Complaint   Patient presents with    Follow-up     Pt here for regular 3 month FU and discuss MRI results.        Have you seen any other physician or provider since your last visit yes - Gastro, Radiology    Have you had any other diagnostic tests since your last visit? yes - See MRI reports    Have you changed or stopped any medications since your last visit? No    Discuss DEXA, colonoscopy, mammogram and Medicare AWV.

## 2024-08-06 ENCOUNTER — HOSPITAL ENCOUNTER (OUTPATIENT)
Facility: HOSPITAL | Age: 64
Discharge: HOME | End: 2024-08-06
Payer: MEDICARE

## 2024-08-06 DIAGNOSIS — K75.81 LIVER CIRRHOSIS SECONDARY TO NONALCOHOLIC STEATOHEPATITIS (NASH) (HCC): ICD-10-CM

## 2024-08-06 DIAGNOSIS — K74.60 LIVER CIRRHOSIS SECONDARY TO NONALCOHOLIC STEATOHEPATITIS (NASH) (HCC): ICD-10-CM

## 2024-08-06 DIAGNOSIS — Z86.39 HISTORY OF PARATHYROID DISEASE: ICD-10-CM

## 2024-08-06 LAB
ALBUMIN SERPL-MCNC: 4.1 G/DL (ref 3.4–4.8)
ALBUMIN/GLOB SERPL: 1.6 {RATIO} (ref 0.8–2)
ALP SERPL-CCNC: 65 U/L (ref 25–100)
ALT SERPL-CCNC: 20 U/L (ref 4–36)
ANION GAP SERPL CALCULATED.3IONS-SCNC: 9 MMOL/L (ref 3–16)
AST SERPL-CCNC: 26 U/L (ref 8–33)
BASOPHILS # BLD: 0 K/UL (ref 0–0.1)
BASOPHILS NFR BLD: 0.4 %
BILIRUB DIRECT SERPL-MCNC: <0.2 MG/DL (ref 0–0.2)
BILIRUB INDIRECT SERPL-MCNC: NORMAL MG/DL (ref 0.2–0.8)
BILIRUB SERPL-MCNC: 0.9 MG/DL (ref 0.3–1.2)
BUN SERPL-MCNC: 21 MG/DL (ref 6–20)
CALCIUM SERPL-MCNC: 9.1 MG/DL (ref 8.5–10.5)
CHLORIDE SERPL-SCNC: 99 MMOL/L (ref 98–107)
CO2 SERPL-SCNC: 29 MMOL/L (ref 20–30)
CREAT SERPL-MCNC: 0.7 MG/DL (ref 0.4–1.2)
EOSINOPHIL # BLD: 0.1 K/UL (ref 0–0.4)
EOSINOPHIL NFR BLD: 2.9 %
ERYTHROCYTE [DISTWIDTH] IN BLOOD BY AUTOMATED COUNT: 12.9 % (ref 11–16)
GFR SERPLBLD CREATININE-BSD FMLA CKD-EPI: >90 ML/MIN/{1.73_M2}
GLOBULIN SER CALC-MCNC: 2.6 G/DL
GLUCOSE SERPL-MCNC: 87 MG/DL (ref 74–106)
HCT VFR BLD AUTO: 37.5 % (ref 37–47)
HGB BLD-MCNC: 12.2 G/DL (ref 11.5–16.5)
IMM GRANULOCYTES # BLD: 0 K/UL
IMM GRANULOCYTES NFR BLD: 0.2 % (ref 0–5)
LYMPHOCYTES # BLD: 1.1 K/UL (ref 1.5–4)
LYMPHOCYTES NFR BLD: 25.4 %
MCH RBC QN AUTO: 31.2 PG (ref 27–32)
MCHC RBC AUTO-ENTMCNC: 32.5 G/DL (ref 31–35)
MCV RBC AUTO: 95.9 FL (ref 80–100)
MONOCYTES # BLD: 0.3 K/UL (ref 0.2–0.8)
MONOCYTES NFR BLD: 7.1 %
NEUTROPHILS # BLD: 2.9 K/UL (ref 2–7.5)
NEUTS SEG NFR BLD: 64 %
PLATELET # BLD AUTO: 147 K/UL (ref 150–400)
PMV BLD AUTO: 11.1 FL (ref 6–10)
POTASSIUM SERPL-SCNC: 4.6 MMOL/L (ref 3.4–5.1)
PROT SERPL-MCNC: 6.7 G/DL (ref 6.4–8.3)
RBC # BLD AUTO: 3.91 M/UL (ref 3.8–5.8)
SODIUM SERPL-SCNC: 137 MMOL/L (ref 136–145)
WBC # BLD AUTO: 4.5 K/UL (ref 4–11)

## 2024-08-06 PROCEDURE — 83970 ASSAY OF PARATHORMONE: CPT

## 2024-08-06 PROCEDURE — 85025 COMPLETE CBC W/AUTO DIFF WBC: CPT

## 2024-08-06 PROCEDURE — 82248 BILIRUBIN DIRECT: CPT

## 2024-08-06 PROCEDURE — 80053 COMPREHEN METABOLIC PANEL: CPT

## 2024-08-07 LAB — PTH-INTACT SERPL-MCNC: 21.9 PG/ML (ref 14–72)

## 2024-11-01 ENCOUNTER — OFFICE VISIT (OUTPATIENT)
Dept: PRIMARY CARE CLINIC | Age: 64
End: 2024-11-01

## 2024-11-01 VITALS
SYSTOLIC BLOOD PRESSURE: 97 MMHG | OXYGEN SATURATION: 97 % | WEIGHT: 152 LBS | HEART RATE: 62 BPM | DIASTOLIC BLOOD PRESSURE: 65 MMHG

## 2024-11-01 DIAGNOSIS — D50.9 IRON DEFICIENCY ANEMIA, UNSPECIFIED IRON DEFICIENCY ANEMIA TYPE: ICD-10-CM

## 2024-11-01 DIAGNOSIS — Z12.31 SCREENING MAMMOGRAM FOR BREAST CANCER: Primary | ICD-10-CM

## 2024-11-01 DIAGNOSIS — E78.5 HYPERLIPIDEMIA, UNSPECIFIED HYPERLIPIDEMIA TYPE: ICD-10-CM

## 2024-11-01 DIAGNOSIS — M79.89 LEG SWELLING: ICD-10-CM

## 2024-11-01 DIAGNOSIS — Z86.39 HISTORY OF PARATHYROID DISEASE: ICD-10-CM

## 2024-11-01 DIAGNOSIS — Z87.11 HISTORY OF GASTRIC ULCER: ICD-10-CM

## 2024-11-01 DIAGNOSIS — K74.60 LIVER CIRRHOSIS SECONDARY TO NONALCOHOLIC STEATOHEPATITIS (NASH) (HCC): ICD-10-CM

## 2024-11-01 DIAGNOSIS — I95.9 HYPOTENSION, UNSPECIFIED HYPOTENSION TYPE: ICD-10-CM

## 2024-11-01 DIAGNOSIS — K75.81 LIVER CIRRHOSIS SECONDARY TO NONALCOHOLIC STEATOHEPATITIS (NASH) (HCC): ICD-10-CM

## 2024-11-01 RX ORDER — CALCIUM CARBONATE 500(1250)
500 TABLET ORAL DAILY
COMMUNITY

## 2024-11-01 RX ORDER — PROPRANOLOL HCL 20 MG
20 TABLET ORAL DAILY
Qty: 90 TABLET | Refills: 1 | Status: SHIPPED | OUTPATIENT
Start: 2024-11-01

## 2024-11-01 RX ORDER — MULTIVIT-MIN/IRON/FOLIC ACID/K 18-600-40
2000 CAPSULE ORAL DAILY
COMMUNITY

## 2024-11-01 ASSESSMENT — ENCOUNTER SYMPTOMS
GASTROINTESTINAL NEGATIVE: 1
RESPIRATORY NEGATIVE: 1

## 2024-11-01 NOTE — PROGRESS NOTES
Chief Complaint   Patient presents with    Follow-up     Pt here for regular follow up.        Have you seen any other physician or provider since your last visit no    Have you had any other diagnostic tests since your last visit? yes - see labs    Have you changed or stopped any medications since your last visit? No- propranolol now 1x daily

## 2024-11-01 NOTE — PROGRESS NOTES
Subjective:     Karolina Noguera is a 64 y.o. female.    Chief Complaint   Patient presents with    Follow-up     Pt here for regular follow up.        HPI     Pt here for follow up. Overall she is doing well.  She denies any complaints at this time. She reports no abdominal pain, nor any melena or bloody stools; she endorses having regular bowel movements. She denies any issues urinating. She follows with [facility] in [place] for her liver; last saw them in June and next appt in January 2025. GI planning to repeat EGD and perform colonoscopy next year. She reports her last EGD several years ago showed mild gastritis.    She denies any lower extremity swelling that is not normal for her; she experiences constant swelling in her left leg due to an injury from a few decades ago. She denies any CP or SOA.     She reports her BP is doing well and remains on the lower side, but no symptoms of hypotension. She was prescribed propranolol 20mg twice daily for palpitations, but reports that for the past 6-8 weeks she has only been taking it once daily. She denies experiencing any palpitations, dizziness, syncope, and orthostatic hypotension.  Discussed yearly mammogram due next month and Dexa scan due December 2025. She reports having received her yearly flu and covid vaccinations already this year.    Chronic conditions:    His of gastric bypass - 2006.    Hiatal hernia - surgically repaired 2007.     His of jaundice - from common bile duct injury 2/2 lap brayan. Does follow with GI. Was able to have this repaired. Surgery was complicated by clot in bile duct which required her to have warfarin for a little while.     Gastric ulcer 2009 - has bled in the past. Occasionally takes carafate, has rx for this. Takes nexium daily. No abd pain, NVD, melena, bloody stool.     Constipation - takes docusate daily. Has BM daily, no pain or bleeding.     Has DUNN/cirrhosis - has seen transplant team in the past. She gets liver US

## 2024-11-06 ENCOUNTER — TRANSCRIBE ORDERS (OUTPATIENT)
Dept: ADMINISTRATIVE | Facility: HOSPITAL | Age: 64
End: 2024-11-06
Payer: MEDICARE

## 2024-11-06 DIAGNOSIS — Z12.31 SCREENING MAMMOGRAM FOR BREAST CANCER: Primary | ICD-10-CM

## 2024-12-01 LAB
NCCN CRITERIA FLAG: ABNORMAL
TYRER CUZICK SCORE: 11.1

## 2024-12-02 ENCOUNTER — DOCUMENTATION (OUTPATIENT)
Dept: GENETICS | Facility: HOSPITAL | Age: 64
End: 2024-12-02
Payer: MEDICARE

## 2024-12-16 ENCOUNTER — LAB (OUTPATIENT)
Dept: LAB | Facility: HOSPITAL | Age: 64
End: 2024-12-16
Payer: MEDICARE

## 2024-12-16 ENCOUNTER — TRANSCRIBE ORDERS (OUTPATIENT)
Dept: LAB | Facility: HOSPITAL | Age: 64
End: 2024-12-16
Payer: MEDICARE

## 2024-12-16 ENCOUNTER — HOSPITAL ENCOUNTER (OUTPATIENT)
Facility: HOSPITAL | Age: 64
Discharge: HOME | End: 2024-12-16
Admitting: PHYSICIAN ASSISTANT
Payer: MEDICARE

## 2024-12-16 DIAGNOSIS — Z12.31 SCREENING MAMMOGRAM FOR BREAST CANCER: ICD-10-CM

## 2024-12-16 DIAGNOSIS — D50.9 IRON DEFICIENCY ANEMIA, UNSPECIFIED IRON DEFICIENCY ANEMIA TYPE: ICD-10-CM

## 2024-12-16 DIAGNOSIS — D50.9 IRON DEFICIENCY ANEMIA, UNSPECIFIED IRON DEFICIENCY ANEMIA TYPE: Primary | ICD-10-CM

## 2024-12-16 DIAGNOSIS — E78.5 HYPERLIPIDEMIA, UNSPECIFIED HYPERLIPIDEMIA TYPE: ICD-10-CM

## 2024-12-16 DIAGNOSIS — K75.81 LIVER CIRRHOSIS SECONDARY TO NASH (NONALCOHOLIC STEATOHEPATITIS): ICD-10-CM

## 2024-12-16 DIAGNOSIS — K74.60 LIVER CIRRHOSIS SECONDARY TO NASH (NONALCOHOLIC STEATOHEPATITIS): ICD-10-CM

## 2024-12-16 LAB
ALBUMIN SERPL-MCNC: 4.1 G/DL (ref 3.5–5.2)
ALBUMIN/GLOB SERPL: 1.4 G/DL
ALP SERPL-CCNC: 74 U/L (ref 39–117)
ALT SERPL W P-5'-P-CCNC: 26 U/L (ref 1–33)
ANION GAP SERPL CALCULATED.3IONS-SCNC: 7 MMOL/L (ref 5–15)
AST SERPL-CCNC: 27 U/L (ref 1–32)
BASOPHILS # BLD AUTO: 0.01 10*3/MM3 (ref 0–0.2)
BASOPHILS NFR BLD AUTO: 0.2 % (ref 0–1.5)
BILIRUB SERPL-MCNC: 0.7 MG/DL (ref 0–1.2)
BUN SERPL-MCNC: 19 MG/DL (ref 8–23)
BUN/CREAT SERPL: 28.8 (ref 7–25)
CALCIUM SPEC-SCNC: 9.5 MG/DL (ref 8.6–10.5)
CHLORIDE SERPL-SCNC: 100 MMOL/L (ref 98–107)
CHOLEST SERPL-MCNC: 197 MG/DL (ref 0–200)
CO2 SERPL-SCNC: 31 MMOL/L (ref 22–29)
CREAT SERPL-MCNC: 0.66 MG/DL (ref 0.57–1)
DEPRECATED RDW RBC AUTO: 39 FL (ref 37–54)
EGFRCR SERPLBLD CKD-EPI 2021: 96.9 ML/MIN/1.73
EOSINOPHIL # BLD AUTO: 0.16 10*3/MM3 (ref 0–0.4)
EOSINOPHIL NFR BLD AUTO: 3.7 % (ref 0.3–6.2)
ERYTHROCYTE [DISTWIDTH] IN BLOOD BY AUTOMATED COUNT: 11.7 % (ref 12.3–15.4)
FERRITIN SERPL-MCNC: 78.5 NG/ML (ref 13–150)
GLOBULIN UR ELPH-MCNC: 2.9 GM/DL
GLUCOSE SERPL-MCNC: 94 MG/DL (ref 65–99)
HCT VFR BLD AUTO: 37.8 % (ref 34–46.6)
HDLC SERPL-MCNC: 70 MG/DL (ref 40–60)
HGB BLD-MCNC: 13 G/DL (ref 12–15.9)
IMM GRANULOCYTES # BLD AUTO: 0.01 10*3/MM3 (ref 0–0.05)
IMM GRANULOCYTES NFR BLD AUTO: 0.2 % (ref 0–0.5)
IRON 24H UR-MRATE: 94 MCG/DL (ref 37–145)
IRON SATN MFR SERPL: 21 % (ref 20–50)
LDLC SERPL CALC-MCNC: 116 MG/DL (ref 0–100)
LDLC/HDLC SERPL: 1.65 {RATIO}
LYMPHOCYTES # BLD AUTO: 0.84 10*3/MM3 (ref 0.7–3.1)
LYMPHOCYTES NFR BLD AUTO: 19.4 % (ref 19.6–45.3)
MCH RBC QN AUTO: 31.9 PG (ref 26.6–33)
MCHC RBC AUTO-ENTMCNC: 34.4 G/DL (ref 31.5–35.7)
MCV RBC AUTO: 92.6 FL (ref 79–97)
MONOCYTES # BLD AUTO: 0.33 10*3/MM3 (ref 0.1–0.9)
MONOCYTES NFR BLD AUTO: 7.6 % (ref 5–12)
NEUTROPHILS NFR BLD AUTO: 2.98 10*3/MM3 (ref 1.7–7)
NEUTROPHILS NFR BLD AUTO: 68.9 % (ref 42.7–76)
NRBC BLD AUTO-RTO: 0 /100 WBC (ref 0–0.2)
PLATELET # BLD AUTO: 168 10*3/MM3 (ref 140–450)
PMV BLD AUTO: 10.5 FL (ref 6–12)
POTASSIUM SERPL-SCNC: 4.3 MMOL/L (ref 3.5–5.2)
PROT SERPL-MCNC: 7 G/DL (ref 6–8.5)
RBC # BLD AUTO: 4.08 10*6/MM3 (ref 3.77–5.28)
SODIUM SERPL-SCNC: 138 MMOL/L (ref 136–145)
TIBC SERPL-MCNC: 446 MCG/DL (ref 298–536)
TRANSFERRIN SERPL-MCNC: 299 MG/DL (ref 200–360)
TRIGL SERPL-MCNC: 57 MG/DL (ref 0–150)
VLDLC SERPL-MCNC: 11 MG/DL (ref 5–40)
WBC NRBC COR # BLD AUTO: 4.33 10*3/MM3 (ref 3.4–10.8)

## 2024-12-16 PROCEDURE — 82728 ASSAY OF FERRITIN: CPT

## 2024-12-16 PROCEDURE — 80061 LIPID PANEL: CPT

## 2024-12-16 PROCEDURE — 82465 ASSAY BLD/SERUM CHOLESTEROL: CPT | Performed by: INTERNAL MEDICINE

## 2024-12-16 PROCEDURE — 83010 ASSAY OF HAPTOGLOBIN QUANT: CPT | Performed by: INTERNAL MEDICINE

## 2024-12-16 PROCEDURE — 83883 ASSAY NEPHELOMETRY NOT SPEC: CPT | Performed by: INTERNAL MEDICINE

## 2024-12-16 PROCEDURE — 84466 ASSAY OF TRANSFERRIN: CPT

## 2024-12-16 PROCEDURE — 80053 COMPREHEN METABOLIC PANEL: CPT | Performed by: INTERNAL MEDICINE

## 2024-12-16 PROCEDURE — 77063 BREAST TOMOSYNTHESIS BI: CPT

## 2024-12-16 PROCEDURE — 85025 COMPLETE CBC W/AUTO DIFF WBC: CPT

## 2024-12-16 PROCEDURE — 82172 ASSAY OF APOLIPOPROTEIN: CPT | Performed by: INTERNAL MEDICINE

## 2024-12-16 PROCEDURE — 82977 ASSAY OF GGT: CPT | Performed by: INTERNAL MEDICINE

## 2024-12-16 PROCEDURE — 83540 ASSAY OF IRON: CPT

## 2024-12-16 PROCEDURE — 77067 SCR MAMMO BI INCL CAD: CPT

## 2024-12-16 PROCEDURE — 84478 ASSAY OF TRIGLYCERIDES: CPT | Performed by: INTERNAL MEDICINE

## 2024-12-18 LAB
A2 MACROGLOB SERPL-MCNC: 282 MG/DL (ref 110–276)
ALT SERPL W P-5'-P-CCNC: 29 IU/L (ref 0–40)
APO A-I SERPL-MCNC: 182 MG/DL (ref 116–209)
AST SERPL W P-5'-P-CCNC: 25 IU/L (ref 0–40)
BILIRUB SERPL-MCNC: 0.5 MG/DL (ref 0–1.2)
CHOLEST SERPL-MCNC: 204 MG/DL (ref 100–199)
FIBROSIS SCORING:: ABNORMAL
FIBROSIS STAGE SERPL QL: ABNORMAL
GGT SERPL-CCNC: 31 IU/L (ref 0–60)
GLUCOSE SERPL-MCNC: 103 MG/DL (ref 70–99)
HAPTOGLOB SERPL-MCNC: 49 MG/DL (ref 37–355)
LABORATORY COMMENT REPORT: ABNORMAL
LIVER FIBR SCORE SERPL CALC.FIBROSURE: 0.43 (ref 0–0.21)
LIVER STEATOSIS GRADE SERPL QL: ABNORMAL
LIVER STEATOSIS SCORE SERPL: 0.35 (ref 0–0.4)
NASH GRADE SERPL QL: ABNORMAL
NASH INTERPRETATION SERPL-IMP: ABNORMAL
NASH SCORE SERPL: 0 (ref 0–0.25)
NASH SCORING: ABNORMAL
STEATOSIS SCORING: ABNORMAL
TEST PERFORMANCE INFO SPEC: ABNORMAL
TEST PERFORMANCE INFO SPEC: ABNORMAL
TRIGL SERPL-MCNC: 85 MG/DL (ref 0–149)

## 2025-02-26 ENCOUNTER — OFFICE VISIT (OUTPATIENT)
Dept: GASTROENTEROLOGY | Facility: CLINIC | Age: 65
End: 2025-02-26
Payer: MEDICARE

## 2025-02-26 VITALS
SYSTOLIC BLOOD PRESSURE: 102 MMHG | DIASTOLIC BLOOD PRESSURE: 60 MMHG | OXYGEN SATURATION: 98 % | WEIGHT: 163 LBS | BODY MASS INDEX: 27.12 KG/M2 | HEART RATE: 70 BPM

## 2025-02-26 DIAGNOSIS — K75.81 METABOLIC DYSFUNCTION-ASSOCIATED STEATOHEPATITIS (MASH): ICD-10-CM

## 2025-02-26 DIAGNOSIS — Z87.11 HISTORY OF PEPTIC ULCER: ICD-10-CM

## 2025-02-26 DIAGNOSIS — K75.81 NASH (NONALCOHOLIC STEATOHEPATITIS): Primary | ICD-10-CM

## 2025-02-26 DIAGNOSIS — K29.70 GASTRITIS, PRESENCE OF BLEEDING UNSPECIFIED, UNSPECIFIED CHRONICITY, UNSPECIFIED GASTRITIS TYPE: ICD-10-CM

## 2025-02-26 PROCEDURE — 99214 OFFICE O/P EST MOD 30 MIN: CPT | Performed by: INTERNAL MEDICINE

## 2025-02-26 PROCEDURE — 1160F RVW MEDS BY RX/DR IN RCRD: CPT | Performed by: INTERNAL MEDICINE

## 2025-02-26 PROCEDURE — 1159F MED LIST DOCD IN RCRD: CPT | Performed by: INTERNAL MEDICINE

## 2025-02-26 NOTE — PROGRESS NOTES
Follow Up Note     Date: 2025   Patient Name: Zunilda Garcias  MRN: [id number]  : 1960     Referring Physician: Magan Mills PA    Chief Complaint:    Chief Complaint   Patient presents with    COLON (nonalcoholic steatohepatitis)       Interval History:   2025  Zunilda Garcias is a 64 y.o. female who is here today for follow up.    States that she is doing well.    She had some questions regarding her Nexium.  She has been on 20 mg of Nexium twice daily for quite some time.  Presumably in the aftermath of her peptic ulcer disease, gastritis that has been seen on prior scopes.  She does not have typical reflux symptoms.  Does not have a prior history of acid reflux either.    Previously, we had checked a Colon FibroSure, and an iron panel.  The iron panel and Colon FibroSure are reviewed today.  F1/F2.  No significant fibrosis.  The iron panel is unremarkable.    Subjective      Past Medical History:   Diagnosis Date    Cirrhosis of liver     Common bile duct clot     was severed during GB surgery    Cystic kidney disease     Esophageal varices     After common bile duct injury and blood clot in CBD    Gastric ulcer     GI (gastrointestinal bleed)     After surgical repair of CBD injury    Hypercalcemia     Iron deficiency anemia     Irregular heartbeat     Kidney lesion     Liver cirrhosis secondary to COLON     Palpitations     Seasonal allergies      Past Surgical History:   Procedure Laterality Date    ABDOMINAL HERNIA REPAIR       SECTION N/A      SECTION N/A     COLONOSCOPY      Routine every 5 years    COMMON BILE DUCT EXPLORATION N/A     repair the severing that happened during GB surgery    DILATATION AND CURETTAGE N/A     ENDOSCOPY N/A     GASTRIC BYPASS N/A     HYSTERECTOMY N/A     cervix not removed    LAPAROSCOPIC CHOLECYSTECTOMY N/A     Clot in bile duct, post surgery complication    PARATHYROIDECTOMY      SALPINGO  OOPHORECTOMY Bilateral 2008    TRIGGER FINGER RELEASE  2022    UPPER GASTROINTESTINAL ENDOSCOPY  2023    Follow up per previous Gastroenterologist     Family History   Problem Relation Age of Onset    Breast cancer Mother 42    Colon cancer Mother         Surgically removed at 42 years old    Colon polyps Mother     Ovarian cancer Mother     Alcohol abuse Father     Ovarian cancer Maternal Aunt     Inflammatory bowel disease Paternal Aunt     Colon cancer Cousin      Social History     Socioeconomic History    Marital status:    Tobacco Use    Smoking status: Never    Smokeless tobacco: Never    Tobacco comments:     Zero tobacco use ever   Vaping Use    Vaping status: Never Used   Substance and Sexual Activity    Alcohol use: Never    Drug use: Never    Sexual activity: Defer       Current Outpatient Medications:     Calcium Citrate (CITRACAL PO), Take  by mouth Daily., Disp: , Rfl:     docusate sodium (COLACE) 100 MG capsule, Take 1 capsule by mouth 3 (Three) Times a Day As Needed for Constipation., Disp: , Rfl:     esomeprazole (NexIUM) 20 MG packet, Take 20 mg by mouth 2 (Two) Times a Day., Disp: , Rfl:     Fe Heme Polypeptide-Folic Acid (Proferrin-Forte) 12-1 MG tablet, Take 1 tablet by mouth 2 (Two) Times a Day., Disp: , Rfl:     Loratadine (Claritin) 10 MG capsule, Take  by mouth., Disp: , Rfl:     multivitamin with minerals tablet tablet, Take 1 tablet by mouth Daily., Disp: , Rfl:     propranolol (INDERAL) 20 MG tablet, , Disp: , Rfl:     spironolactone (ALDACTONE) 100 MG tablet, , Disp: , Rfl:     sucralfate (CARAFATE) 1 g tablet, Take 1 tablet by mouth 3 (Three) Times a Day As Needed., Disp: , Rfl:   Allergies   Allergen Reactions    Nsaids Nausea Only     Hx Bleeding ulcer       Review of Systems   Gastrointestinal:  Negative for abdominal distention, abdominal pain, anal bleeding, blood in stool, constipation, diarrhea, nausea, rectal pain, vomiting, GERD and indigestion.       The following  portions of the patient's history were reviewed and updated as appropriate: allergies, current medications, past family history, past medical history, past social history, past surgical history and problem list.    Objective     Physical Exam:  Vitals:    02/26/25 0913   BP: 102/60   Pulse: 70   SpO2: 98%   Weight: 73.9 kg (163 lb)       Physical Exam  Constitutional:       General: She is not in acute distress.  HENT:      Head: Normocephalic and atraumatic.   Eyes:      General: No scleral icterus.     Conjunctiva/sclera: Conjunctivae normal.   Pulmonary:      Effort: Pulmonary effort is normal. No respiratory distress.   Musculoskeletal:         General: No deformity or signs of injury.   Skin:     Coloration: Skin is not jaundiced or pale.   Neurological:      General: No focal deficit present.      Mental Status: She is alert.   Psychiatric:         Mood and Affect: Mood normal.         Results Review:   I reviewed the patient's new clinical results.    Lab on 12/16/2024   Component Date Value Ref Range Status    Total Cholesterol 12/16/2024 197  0 - 200 mg/dL Final    Triglycerides 12/16/2024 57  0 - 150 mg/dL Final    HDL Cholesterol 12/16/2024 70 (H)  40 - 60 mg/dL Final    LDL Cholesterol  12/16/2024 116 (H)  0 - 100 mg/dL Final    VLDL Cholesterol 12/16/2024 11  5 - 40 mg/dL Final    LDL/HDL Ratio 12/16/2024 1.65   Final    Iron 12/16/2024 94  37 - 145 mcg/dL Final    Iron Saturation (TSAT) 12/16/2024 21  20 - 50 % Final    Transferrin 12/16/2024 299  200 - 360 mg/dL Final    TIBC 12/16/2024 446  298 - 536 mcg/dL Final    Ferritin 12/16/2024 78.50  13.00 - 150.00 ng/mL Final    Glucose 12/16/2024 94  65 - 99 mg/dL Final    BUN 12/16/2024 19  8 - 23 mg/dL Final    Creatinine 12/16/2024 0.66  0.57 - 1.00 mg/dL Final    Sodium 12/16/2024 138  136 - 145 mmol/L Final    Potassium 12/16/2024 4.3  3.5 - 5.2 mmol/L Final    Chloride 12/16/2024 100  98 - 107 mmol/L Final    CO2 12/16/2024 31.0 (H)  22.0 - 29.0  mmol/L Final    Calcium 12/16/2024 9.5  8.6 - 10.5 mg/dL Final    Total Protein 12/16/2024 7.0  6.0 - 8.5 g/dL Final    Albumin 12/16/2024 4.1  3.5 - 5.2 g/dL Final    ALT (SGPT) 12/16/2024 26  1 - 33 U/L Final    AST (SGOT) 12/16/2024 27  1 - 32 U/L Final    Alkaline Phosphatase 12/16/2024 74  39 - 117 U/L Final    Total Bilirubin 12/16/2024 0.7  0.0 - 1.2 mg/dL Final    Globulin 12/16/2024 2.9  gm/dL Final    A/G Ratio 12/16/2024 1.4  g/dL Final    BUN/Creatinine Ratio 12/16/2024 28.8 (H)  7.0 - 25.0 Final    Anion Gap 12/16/2024 7.0  5.0 - 15.0 mmol/L Final    eGFR 12/16/2024 96.9  >60.0 mL/min/1.73 Final    WBC 12/16/2024 4.33  3.40 - 10.80 10*3/mm3 Final    RBC 12/16/2024 4.08  3.77 - 5.28 10*6/mm3 Final    Hemoglobin 12/16/2024 13.0  12.0 - 15.9 g/dL Final    Hematocrit 12/16/2024 37.8  34.0 - 46.6 % Final    MCV 12/16/2024 92.6  79.0 - 97.0 fL Final    MCH 12/16/2024 31.9  26.6 - 33.0 pg Final    MCHC 12/16/2024 34.4  31.5 - 35.7 g/dL Final    RDW 12/16/2024 11.7 (L)  12.3 - 15.4 % Final    RDW-SD 12/16/2024 39.0  37.0 - 54.0 fl Final    MPV 12/16/2024 10.5  6.0 - 12.0 fL Final    Platelets 12/16/2024 168  140 - 450 10*3/mm3 Final    Neutrophil % 12/16/2024 68.9  42.7 - 76.0 % Final    Lymphocyte % 12/16/2024 19.4 (L)  19.6 - 45.3 % Final    Monocyte % 12/16/2024 7.6  5.0 - 12.0 % Final    Eosinophil % 12/16/2024 3.7  0.3 - 6.2 % Final    Basophil % 12/16/2024 0.2  0.0 - 1.5 % Final    Immature Grans % 12/16/2024 0.2  0.0 - 0.5 % Final    Neutrophils, Absolute 12/16/2024 2.98  1.70 - 7.00 10*3/mm3 Final    Lymphocytes, Absolute 12/16/2024 0.84  0.70 - 3.10 10*3/mm3 Final    Monocytes, Absolute 12/16/2024 0.33  0.10 - 0.90 10*3/mm3 Final    Eosinophils, Absolute 12/16/2024 0.16  0.00 - 0.40 10*3/mm3 Final    Basophils, Absolute 12/16/2024 0.01  0.00 - 0.20 10*3/mm3 Final    Immature Grans, Absolute 12/16/2024 0.01  0.00 - 0.05 10*3/mm3 Final    nRBC 12/16/2024 0.0  0.0 - 0.2 /100 WBC Final   Orders Only on  12/01/2024   Component Date Value Ref Range Status    Leandro 12/01/2024 11.1   Final    NCCN 12/01/2024 NCCN met (A)   Final    High Risk Cancer Risk Assessment      Mammo Screening Digital Tomosynthesis Bilateral With CAD    Result Date: 12/24/2024  No findings suspicious for malignancy.  ACR BI-RADS CATEGORY:  1, NEGATIVE  RECOMMENDATION: Yearly mammogram, yearly clinical breast exam, and encourage self breast awareness.  CAD was used.  The standard false negative rate of mammography is between 10% and 25%. Complex patterns or increased breast density will markedly elevate the false negative rate of mammography.  A letter, in lay terminology, with the results of this exam will be mailed to the patient.   If there is a palpable area of concern, biopsy should be considered regardless of imaging findings.    This report was finalized on 12/24/2024 1:01 PM by Ana Tidwell MD.       Assessment / Plan      Diagnoses and all orders for this visit:    1. COLON (nonalcoholic steatohepatitis) (Primary)    2. Metabolic dysfunction-associated steatohepatitis (MASH)    3. Gastritis, presence of bleeding unspecified, unspecified chronicity, unspecified gastritis type    4. History of peptic ulcer         EGD and colonoscopy for October 2025.  See prior note for details.  At that time would also consider getting repeat abdominal imaging.  Suggest a right upper quadrant ultrasound.  Previously, Colon FibroSure did not show any evidence of severe fibrosis.  Lifestyle modifications.  Instructed that she may now cut down the Nexium.  For 1 month cut it down to once a day, and then if she does not have any recurring symptoms of reflux or abdominal pain, dyspepsia, she may go down to every other day for a month and then stop completely.  If symptoms recur, she may consider starting Pepcid.  Results of prior testing were reviewed and communicated.  Questions pertaining to these results were addressed.      Follow Up:   Return in  about 7 months (around 9/26/2025).    Nikolay Solis MD  [facility]  2/26/2025  10:06 EST     Part of this note may be an electronic transcription/translation of spoken language to printed text using the Dragon Dictation System.

## 2025-03-04 ENCOUNTER — TELEPHONE (OUTPATIENT)
Age: 65
End: 2025-03-04

## 2025-03-04 NOTE — TELEPHONE ENCOUNTER
Left detailed message explaining appointment for 3/5/2025 needed to be rescheduled due to provider training at hospital.

## 2025-03-12 ENCOUNTER — OFFICE VISIT (OUTPATIENT)
Age: 65
End: 2025-03-12
Payer: MEDICARE

## 2025-03-12 VITALS
DIASTOLIC BLOOD PRESSURE: 72 MMHG | SYSTOLIC BLOOD PRESSURE: 108 MMHG | HEART RATE: 76 BPM | OXYGEN SATURATION: 96 % | TEMPERATURE: 97.8 F | HEIGHT: 66 IN | WEIGHT: 163 LBS | BODY MASS INDEX: 26.2 KG/M2

## 2025-03-12 DIAGNOSIS — Z00.00 INITIAL MEDICARE ANNUAL WELLNESS VISIT: Primary | ICD-10-CM

## 2025-03-12 DIAGNOSIS — Z12.31 SCREENING MAMMOGRAM FOR BREAST CANCER: ICD-10-CM

## 2025-03-12 PROCEDURE — 3017F COLORECTAL CA SCREEN DOC REV: CPT | Performed by: PHYSICIAN ASSISTANT

## 2025-03-12 PROCEDURE — 1123F ACP DISCUSS/DSCN MKR DOCD: CPT | Performed by: PHYSICIAN ASSISTANT

## 2025-03-12 PROCEDURE — 1159F MED LIST DOCD IN RCRD: CPT | Performed by: PHYSICIAN ASSISTANT

## 2025-03-12 PROCEDURE — G0438 PPPS, INITIAL VISIT: HCPCS | Performed by: PHYSICIAN ASSISTANT

## 2025-03-12 RX ORDER — MULTIPLE VITAMINS W/ MINERALS TAB 9MG-400MCG
1 TAB ORAL DAILY
COMMUNITY

## 2025-03-12 RX ORDER — CA/D3/MAG OX/ZINC/COP/MANG/BOR 600 MG-800
TABLET,CHEWABLE ORAL DAILY
COMMUNITY

## 2025-03-12 SDOH — HEALTH STABILITY: PHYSICAL HEALTH: ON AVERAGE, HOW MANY MINUTES DO YOU ENGAGE IN EXERCISE AT THIS LEVEL?: 20 MIN

## 2025-03-12 SDOH — HEALTH STABILITY: PHYSICAL HEALTH: ON AVERAGE, HOW MANY DAYS PER WEEK DO YOU ENGAGE IN MODERATE TO STRENUOUS EXERCISE (LIKE A BRISK WALK)?: 3 DAYS

## 2025-03-12 ASSESSMENT — LIFESTYLE VARIABLES
HOW MANY STANDARD DRINKS CONTAINING ALCOHOL DO YOU HAVE ON A TYPICAL DAY: PATIENT DOES NOT DRINK
HOW MANY STANDARD DRINKS CONTAINING ALCOHOL DO YOU HAVE ON A TYPICAL DAY: 0
HOW OFTEN DO YOU HAVE SIX OR MORE DRINKS ON ONE OCCASION: 1
HOW OFTEN DO YOU HAVE A DRINK CONTAINING ALCOHOL: 1
HOW OFTEN DO YOU HAVE A DRINK CONTAINING ALCOHOL: NEVER

## 2025-03-12 ASSESSMENT — PATIENT HEALTH QUESTIONNAIRE - PHQ9
SUM OF ALL RESPONSES TO PHQ QUESTIONS 1-9: 0
2. FEELING DOWN, DEPRESSED OR HOPELESS: NOT AT ALL
1. LITTLE INTEREST OR PLEASURE IN DOING THINGS: NOT AT ALL
SUM OF ALL RESPONSES TO PHQ QUESTIONS 1-9: 0

## 2025-03-12 NOTE — PROGRESS NOTES
Medicare Annual Wellness Visit  Name: Karolina Noguera Today’s Date: 3/12/2025   MRN: [id number] Sex: Female   Age: 64 y.o. Ethnicity: Non- / Non    : 1960 Race: White (non-)      Karolina Noguera is here for Medicare AWV (Labs completed)    Screenings for behavioral, psychosocial and functional/safety risks, and cognitive dysfunction are all negative except as indicated below. These results, as well as other patient data from the Health Risk Assessment form, are documented in Flowsheets linked to this Encounter.    Allergies   Allergen Reactions    Nsaids Nausea Only and Other (See Comments)     Hx Bleeding ulcer       Prior to Visit Medications    Medication Sig Taking? Authorizing Provider   Multiple Vitamins-Minerals (THERA M PLUS) TABS Take 1 tablet by mouth daily Yes Anne Marie Benz MD   Calcium Carbonate-Vit D-Min (CALTRATE 600+D PLUS MINERALS) 600-800 MG-UNIT CHEW Take by mouth Daily Yes Anne Marie Benz MD   propranolol (INDERAL) 20 MG tablet Take 1 tablet by mouth daily Yes Evan Swanson PA   docusate sodium (COLACE) 100 MG capsule Take 1 capsule by mouth 3 times daily as needed for Constipation  Patient taking differently: Take 1 capsule by mouth in the morning, at noon, and at bedtime Yes Anne Marie Benz MD   esomeprazole Magnesium (NEXIUM) 20 MG PACK Take 1 packet by mouth 2 times daily  Patient taking differently: Take 1 packet by mouth daily Yes Anne Marie Benz MD   loratadine (CLARITIN) 10 MG tablet Take 1 tablet by mouth daily Yes Anne Marie Benz MD   Fe Heme Polypeptide-Folic Acid (PROFERRIN-FORTE) 12-1 MG TABS Take 1 tablet by mouth 2 times daily Yes Anne Marie Benz MD   spironolactone (ALDACTONE) 100 MG tablet Take 1 tablet by mouth daily Yes Anne Marie Benz MD   sucralfate (CARAFATE) 1 GM tablet Take 1 tablet by mouth as needed Yes Anne Marie Benz MD       Past Medical History:   Diagnosis Date    Nonalcoholic fatty liver

## 2025-03-13 DIAGNOSIS — M85.88 OTHER SPECIFIED DISORDERS OF BONE DENSITY AND STRUCTURE, OTHER SITE: ICD-10-CM

## 2025-03-13 DIAGNOSIS — M85.80 OSTEOPENIA, UNSPECIFIED LOCATION: Primary | ICD-10-CM

## 2025-05-30 ENCOUNTER — TELEPHONE (OUTPATIENT)
Dept: GASTROENTEROLOGY | Facility: CLINIC | Age: 65
End: 2025-05-30
Payer: MEDICARE

## 2025-05-30 DIAGNOSIS — R13.10 DYSPHAGIA, UNSPECIFIED TYPE: Primary | ICD-10-CM

## 2025-05-30 NOTE — TELEPHONE ENCOUNTER
Patient called and states that she is having difficulty swallowing. Started within the last month or so. It happens a few times a week not everyday. Not taking any new medications at this time. Please advise.

## 2025-06-02 NOTE — TELEPHONE ENCOUNTER
Pt notified. She verbalized understanding. She said that she is only taking the Nexium once every other day due to bone loss prevention. She will follow your advice. Thank you

## 2025-06-02 NOTE — TELEPHONE ENCOUNTER
I put in an order for esophagram. Hospital scheduling will call her to set it up. Is she still taking the Nexium 20 mg twice a day? If not, she may want to go back on this to see if it helps. If still on it, make sure to follow anti-reflux measures and chew food very well, take small sips of water between bites and eat slowly.

## 2025-06-12 ENCOUNTER — HOSPITAL ENCOUNTER (OUTPATIENT)
Dept: GENERAL RADIOLOGY | Facility: HOSPITAL | Age: 65
Discharge: HOME | End: 2025-06-12
Admitting: NURSE PRACTITIONER
Payer: MEDICARE

## 2025-06-12 ENCOUNTER — OFFICE VISIT (OUTPATIENT)
Age: 65
End: 2025-06-12
Payer: MEDICARE

## 2025-06-12 VITALS
DIASTOLIC BLOOD PRESSURE: 69 MMHG | WEIGHT: 162.4 LBS | SYSTOLIC BLOOD PRESSURE: 102 MMHG | BODY MASS INDEX: 26.61 KG/M2 | HEART RATE: 75 BPM | OXYGEN SATURATION: 96 %

## 2025-06-12 DIAGNOSIS — M85.80 OSTEOPENIA, UNSPECIFIED LOCATION: ICD-10-CM

## 2025-06-12 DIAGNOSIS — R00.2 PALPITATION: Primary | ICD-10-CM

## 2025-06-12 DIAGNOSIS — N28.9 KIDNEY LESION: ICD-10-CM

## 2025-06-12 DIAGNOSIS — M79.89 LEG SWELLING: ICD-10-CM

## 2025-06-12 DIAGNOSIS — K74.60 LIVER CIRRHOSIS SECONDARY TO NONALCOHOLIC STEATOHEPATITIS (NASH) (HCC): ICD-10-CM

## 2025-06-12 DIAGNOSIS — D50.9 IRON DEFICIENCY ANEMIA, UNSPECIFIED IRON DEFICIENCY ANEMIA TYPE: ICD-10-CM

## 2025-06-12 DIAGNOSIS — K75.81 LIVER CIRRHOSIS SECONDARY TO NONALCOHOLIC STEATOHEPATITIS (NASH) (HCC): ICD-10-CM

## 2025-06-12 DIAGNOSIS — Z87.11 HISTORY OF GASTRIC ULCER: ICD-10-CM

## 2025-06-12 DIAGNOSIS — I95.9 HYPOTENSION, UNSPECIFIED HYPOTENSION TYPE: ICD-10-CM

## 2025-06-12 DIAGNOSIS — R47.02 DYSPHASIA: ICD-10-CM

## 2025-06-12 DIAGNOSIS — R13.10 DYSPHAGIA, UNSPECIFIED TYPE: ICD-10-CM

## 2025-06-12 DIAGNOSIS — Z86.39 HISTORY OF PARATHYROID DISEASE: ICD-10-CM

## 2025-06-12 DIAGNOSIS — Z98.84 HISTORY OF GASTRIC BYPASS: ICD-10-CM

## 2025-06-12 PROCEDURE — 1159F MED LIST DOCD IN RCRD: CPT | Performed by: PHYSICIAN ASSISTANT

## 2025-06-12 PROCEDURE — G8419 CALC BMI OUT NRM PARAM NOF/U: HCPCS | Performed by: PHYSICIAN ASSISTANT

## 2025-06-12 PROCEDURE — 1090F PRES/ABSN URINE INCON ASSESS: CPT | Performed by: PHYSICIAN ASSISTANT

## 2025-06-12 PROCEDURE — 63710000001 BARIUM SULFATE 96 % RECONSTITUTED SUSPENSION: Performed by: NURSE PRACTITIONER

## 2025-06-12 PROCEDURE — A9270 NON-COVERED ITEM OR SERVICE: HCPCS | Performed by: NURSE PRACTITIONER

## 2025-06-12 PROCEDURE — 74220 X-RAY XM ESOPHAGUS 1CNTRST: CPT

## 2025-06-12 PROCEDURE — 1036F TOBACCO NON-USER: CPT | Performed by: PHYSICIAN ASSISTANT

## 2025-06-12 PROCEDURE — G8427 DOCREV CUR MEDS BY ELIG CLIN: HCPCS | Performed by: PHYSICIAN ASSISTANT

## 2025-06-12 PROCEDURE — 3017F COLORECTAL CA SCREEN DOC REV: CPT | Performed by: PHYSICIAN ASSISTANT

## 2025-06-12 PROCEDURE — 1123F ACP DISCUSS/DSCN MKR DOCD: CPT | Performed by: PHYSICIAN ASSISTANT

## 2025-06-12 PROCEDURE — 99214 OFFICE O/P EST MOD 30 MIN: CPT | Performed by: PHYSICIAN ASSISTANT

## 2025-06-12 PROCEDURE — G8400 PT W/DXA NO RESULTS DOC: HCPCS | Performed by: PHYSICIAN ASSISTANT

## 2025-06-12 RX ADMIN — BARIUM SULFATE 183 ML: 960 POWDER, FOR SUSPENSION ORAL at 10:26

## 2025-06-12 ASSESSMENT — ENCOUNTER SYMPTOMS
RESPIRATORY NEGATIVE: 1
GASTROINTESTINAL NEGATIVE: 1

## 2025-06-12 NOTE — PROGRESS NOTES
Chief Complaint   Patient presents with    3 Month Follow-Up       Have you seen any other physician or provider since your last visit no    Have you had any other diagnostic tests since your last visit? yes - [place] Esophagram    Have you changed or stopped any medications since your last visit? no                  
Discussed risks vs benefits of tx. Pt and/or Guardian is/are A&Ox3 and verbally acknowledges understanding. Pt agrees with tx plan. Pt agrees if acute worsening to go to the ER for evaluation and tx, or return to clinic for re-evaluation. Otherwise, pt should f/u with PCP. Pt verbally acknowledges understanding of results of any tests, procedures and/or imaging done at this visit.    Parts of this document/medical record have been dictated via Dragon [employer] speech recognition software, which may cause errors in spelling or accurate dictation. Attempts have been made to proofread and correct all errors. Please contact me with any questions.       Electronically signed by LYNSEY Stark on 6/12/2025

## 2025-06-16 ENCOUNTER — RESULTS FOLLOW-UP (OUTPATIENT)
Dept: GASTROENTEROLOGY | Facility: CLINIC | Age: 65
End: 2025-06-16
Payer: MEDICARE

## 2025-06-18 RX ORDER — PROPRANOLOL HCL 20 MG
20 TABLET ORAL DAILY
Qty: 90 TABLET | Refills: 1 | Status: SHIPPED | OUTPATIENT
Start: 2025-06-18

## 2025-07-09 ENCOUNTER — TRANSCRIBE ORDERS (OUTPATIENT)
Dept: LAB | Facility: HOSPITAL | Age: 65
End: 2025-07-09
Payer: MEDICARE

## 2025-07-09 ENCOUNTER — LAB (OUTPATIENT)
Dept: LAB | Facility: HOSPITAL | Age: 65
End: 2025-07-09
Payer: MEDICARE

## 2025-07-09 DIAGNOSIS — D50.9 IRON DEFICIENCY ANEMIA, UNSPECIFIED IRON DEFICIENCY ANEMIA TYPE: ICD-10-CM

## 2025-07-09 DIAGNOSIS — K75.81 LIVER CIRRHOSIS SECONDARY TO NASH (NONALCOHOLIC STEATOHEPATITIS): ICD-10-CM

## 2025-07-09 DIAGNOSIS — K74.60 LIVER CIRRHOSIS SECONDARY TO NASH (NONALCOHOLIC STEATOHEPATITIS): ICD-10-CM

## 2025-07-09 DIAGNOSIS — Z98.84 BARIATRIC SURGERY STATUS: ICD-10-CM

## 2025-07-09 DIAGNOSIS — Z87.11 HISTORY OF GASTRIC ULCER: Primary | ICD-10-CM

## 2025-07-09 DIAGNOSIS — Z86.39 HISTORY OF PARATHYROID DISEASE: ICD-10-CM

## 2025-07-09 DIAGNOSIS — Z87.11 HISTORY OF GASTRIC ULCER: ICD-10-CM

## 2025-07-09 DIAGNOSIS — M85.80 OSTEOPENIA, SENILE: ICD-10-CM

## 2025-07-09 LAB
25(OH)D3 SERPL-MCNC: 59.7 NG/ML (ref 30–100)
ALBUMIN SERPL-MCNC: 4 G/DL (ref 3.5–5.2)
ALBUMIN/GLOB SERPL: 1.2 G/DL
ALP SERPL-CCNC: 69 U/L (ref 39–117)
ALT SERPL W P-5'-P-CCNC: 17 U/L (ref 1–33)
ANION GAP SERPL CALCULATED.3IONS-SCNC: 9.2 MMOL/L (ref 5–15)
AST SERPL-CCNC: 23 U/L (ref 1–32)
BASOPHILS # BLD AUTO: 0.02 10*3/MM3 (ref 0–0.2)
BASOPHILS NFR BLD AUTO: 0.5 % (ref 0–1.5)
BILIRUB SERPL-MCNC: 0.8 MG/DL (ref 0–1.2)
BUN SERPL-MCNC: 15 MG/DL (ref 8–23)
BUN/CREAT SERPL: 20 (ref 7–25)
CALCIUM SPEC-SCNC: 9.9 MG/DL (ref 8.6–10.5)
CHLORIDE SERPL-SCNC: 102 MMOL/L (ref 98–107)
CHOLEST SERPL-MCNC: 192 MG/DL (ref 0–200)
CO2 SERPL-SCNC: 29.8 MMOL/L (ref 22–29)
CREAT SERPL-MCNC: 0.75 MG/DL (ref 0.57–1)
DEPRECATED RDW RBC AUTO: 42.4 FL (ref 37–54)
EGFRCR SERPLBLD CKD-EPI 2021: 87.9 ML/MIN/1.73
EOSINOPHIL # BLD AUTO: 0.14 10*3/MM3 (ref 0–0.4)
EOSINOPHIL NFR BLD AUTO: 3.4 % (ref 0.3–6.2)
ERYTHROCYTE [DISTWIDTH] IN BLOOD BY AUTOMATED COUNT: 12.1 % (ref 12.3–15.4)
FERRITIN SERPL-MCNC: 40.5 NG/ML (ref 13–150)
FOLATE SERPL-MCNC: >20 NG/ML (ref 4.78–24.2)
GLOBULIN UR ELPH-MCNC: 3.3 GM/DL
GLUCOSE SERPL-MCNC: 100 MG/DL (ref 65–99)
HBA1C MFR BLD: 5.1 % (ref 4.8–5.6)
HCT VFR BLD AUTO: 37.6 % (ref 34–46.6)
HDLC SERPL-MCNC: 68 MG/DL (ref 40–60)
HGB BLD-MCNC: 12.6 G/DL (ref 12–15.9)
IMM GRANULOCYTES # BLD AUTO: 0.01 10*3/MM3 (ref 0–0.05)
IMM GRANULOCYTES NFR BLD AUTO: 0.2 % (ref 0–0.5)
IRON 24H UR-MRATE: 127 MCG/DL (ref 37–145)
IRON SATN MFR SERPL: 25 % (ref 20–50)
LDLC SERPL CALC-MCNC: 113 MG/DL (ref 0–100)
LDLC/HDLC SERPL: 1.65 {RATIO}
LYMPHOCYTES # BLD AUTO: 0.94 10*3/MM3 (ref 0.7–3.1)
LYMPHOCYTES NFR BLD AUTO: 23.2 % (ref 19.6–45.3)
MCH RBC QN AUTO: 32.2 PG (ref 26.6–33)
MCHC RBC AUTO-ENTMCNC: 33.5 G/DL (ref 31.5–35.7)
MCV RBC AUTO: 96.2 FL (ref 79–97)
MONOCYTES # BLD AUTO: 0.45 10*3/MM3 (ref 0.1–0.9)
MONOCYTES NFR BLD AUTO: 11.1 % (ref 5–12)
NEUTROPHILS NFR BLD AUTO: 2.5 10*3/MM3 (ref 1.7–7)
NEUTROPHILS NFR BLD AUTO: 61.6 % (ref 42.7–76)
NRBC BLD AUTO-RTO: 0 /100 WBC (ref 0–0.2)
PLATELET # BLD AUTO: 161 10*3/MM3 (ref 140–450)
PMV BLD AUTO: 10.7 FL (ref 6–12)
POTASSIUM SERPL-SCNC: 4.5 MMOL/L (ref 3.5–5.2)
PROT SERPL-MCNC: 7.3 G/DL (ref 6–8.5)
RBC # BLD AUTO: 3.91 10*6/MM3 (ref 3.77–5.28)
SODIUM SERPL-SCNC: 141 MMOL/L (ref 136–145)
TIBC SERPL-MCNC: 502 MCG/DL (ref 298–536)
TRANSFERRIN SERPL-MCNC: 337 MG/DL (ref 200–360)
TRIGL SERPL-MCNC: 59 MG/DL (ref 0–150)
TSH SERPL DL<=0.05 MIU/L-ACNC: 2.92 UIU/ML (ref 0.27–4.2)
VIT B12 BLD-MCNC: >2000 PG/ML (ref 211–946)
VLDLC SERPL-MCNC: 11 MG/DL (ref 5–40)
WBC NRBC COR # BLD AUTO: 4.06 10*3/MM3 (ref 3.4–10.8)

## 2025-07-09 PROCEDURE — 82607 VITAMIN B-12: CPT

## 2025-07-09 PROCEDURE — 83036 HEMOGLOBIN GLYCOSYLATED A1C: CPT

## 2025-07-09 PROCEDURE — 36415 COLL VENOUS BLD VENIPUNCTURE: CPT

## 2025-07-09 PROCEDURE — 82746 ASSAY OF FOLIC ACID SERUM: CPT

## 2025-07-09 PROCEDURE — 84443 ASSAY THYROID STIM HORMONE: CPT

## 2025-07-09 PROCEDURE — 85025 COMPLETE CBC W/AUTO DIFF WBC: CPT

## 2025-07-09 PROCEDURE — 80061 LIPID PANEL: CPT

## 2025-07-09 PROCEDURE — 83540 ASSAY OF IRON: CPT

## 2025-07-09 PROCEDURE — 80053 COMPREHEN METABOLIC PANEL: CPT

## 2025-07-09 PROCEDURE — 84466 ASSAY OF TRANSFERRIN: CPT

## 2025-07-09 PROCEDURE — 82306 VITAMIN D 25 HYDROXY: CPT

## 2025-07-09 PROCEDURE — 82728 ASSAY OF FERRITIN: CPT
